# Patient Record
Sex: MALE | Race: WHITE
[De-identification: names, ages, dates, MRNs, and addresses within clinical notes are randomized per-mention and may not be internally consistent; named-entity substitution may affect disease eponyms.]

---

## 2017-02-17 ENCOUNTER — HOSPITAL ENCOUNTER (EMERGENCY)
Dept: HOSPITAL 80 - CED | Age: 67
Discharge: HOME | End: 2017-02-17
Payer: COMMERCIAL

## 2017-02-17 VITALS — TEMPERATURE: 98 F | RESPIRATION RATE: 18 BRPM

## 2017-02-17 VITALS — DIASTOLIC BLOOD PRESSURE: 96 MMHG | OXYGEN SATURATION: 97 % | HEART RATE: 95 BPM | SYSTOLIC BLOOD PRESSURE: 124 MMHG

## 2017-02-17 DIAGNOSIS — J40: Primary | ICD-10-CM

## 2017-02-17 NOTE — UCPHY
H & P


Patient Type: New


HPI/ROS: 





HPI





CHIEF COMPLAINT:  Cough, congestion, wheezing





HISTORY OF PRESENT ILLNESS:  This patient very pleasant 66-year-old male 

significant past medical history for asthma, presents to the urgent care by 

private vehicle for shortness of breath cough, congestion, and wheezing.  

Patient tells me that the past week he has been sick with chills, productive 

cough with green sputum and wheezing is worse at night.  He has significant 

past medical history for asthma however has never been intubated does not wear 

oxygen.  Does not smoke.  Tells me he has had intermittent nights over the past 

week he has done okay but persistently having a cough and now it is change 

productive.  Denies any fever last 48 hours.





Past Medical History:  Asthma





Past Surgical History:  Denies significant surgical history





Social History:  Denies daily use drugs alcohol tobacco products





Family History:  Noncontributory








ROS   


REVIEW OF SYSTEMS:


A comprehensive 10 point review of systems is otherwise negative aside from 

elements mentioned in the history of present illness.








Exam   


Constitutional    appears well, nontoxic no distress, triage nursing summary 

reviewed, vital signs reviewed, awake/alert. 


Eyes   normal conjunctivae and sclera, EOMI, PERRLA. 


HENT   normal inspection, atraumatic, moist mucus membranes, no epistaxis, neck 

supple/ no meningismus, no raccoon eyes. 


Respiratory   decreased breath sounds bilaterally, bronchitic sounding cough, 

wheezing bilaterally, no rhonchi or crackles , no distress, no tachypnea


Cardiovascular   rate normal, regular rhythm, no murmur, no edema, distal 

pulses normal. 


Gastrointestinal   soft, non-tender, no rebound, no guarding, normal bowel 

sounds, no distension, no pulsatile mass. 


Genitourinary   no CVA tenderness. 


Musculoskeletal  no midline vertebral tenderness, full range of motion, no calf 

swelling, no tenderness of extremities, no meningismus, good pulses, 

neurovascularly intact.


Skin   pink, warm, & dry, no rash, skin atraumatic. 


Neurologic   awake, alert and oriented x 3, AAOx3, moves all 4 extremities 

equally, motor intact, sensory intact, CN II-XII intact, normal cerebellar, 

normal vision, normal speech. 


Psychiatric   normal mood/affect. 


Heme/Lymph/Immune   no lymphadenopathy.





Differential Diagnosis:Includes includes but is not limited to in a particular 

order, acute bronchitis, asthma exacerbation, pneumonia, pneumothorax, CHF





Medical Decision Making:  This patient had a two view chest x-ray to rule out 

significant pneumonia, patient will receive a DuoNeb breathing treatment and 

prednisone 60 mg here in the urgent care.  I will re-evaluate.





Re-evaluation:








ED x-ray chest two view:  Negative for acute cardiopulmonary disease.  Image 

interpreted myself specifically no focal pneumonia.





Time of re-evaluation:  850AM The patient is resting comfortably here, feels 

much better after DuoNeb breathing treatment.  Room air saturation 97% no 

tachypnea.  Wheezing has greatly improved good air movement.  He feels 

comfortable being discharged.  Prescription for prednisone, azithromycin, 

guaifenesin and Norco for cough.  New albuterol inhaler.  He understands return 

to the urgent care or emergency room if develops worsening shortness of breath, 

wheezing cough congestion high fever vomiting or questions or concerns.


Source: Patient





- Family History


Significant Family History: No pertinent family hx


Constitutional: 


 Initial Vital Signs











Temperature (C)  36.6 C   02/17/17 08:12


 


Heart Rate  89   02/17/17 08:12


 


Respiratory Rate  18   02/17/17 08:12


 


Blood Pressure  164/86 H  02/17/17 08:12


 


O2 Sat (%)  92   02/17/17 08:12








 











O2 Delivery Mode               Room Air














Allergies/Adverse Reactions: 


 





No Known Allergies Allergy (Unverified 02/17/17 08:11)


 








Home Medications: 














 Medication  Instructions  Recorded


 


AZITHROMYCIN [Z-PACK] 250 mg PO DAILY #6 tab 02/17/17


 


Albuterol  02/17/17


 


Albuterol [Proventil Inhaler HFA 1 - 2 puffs IH Q4H #1 mdi 02/17/17





(*)]  


 


Alpurnal  02/17/17


 


Guaifenesin [Guaifenesin ER] 600 mg PO BID #14 tab.er.12h 02/17/17


 


Hydrocodone/APAP 5/325 [Norco 1 - 2 tab PO Q4H PRN #10 tab 02/17/17





5/325]  


 


Zopinex  02/17/17


 


predniSONE 60 mg PO DAILY #15 tab 02/17/17














Departure





- Departure


Disposition: Home, Routine, Self-Care


Clinical Impression: 


 Bronchitis





Condition: Good


Instructions:  Asthma (ED), Acute Bronchitis (ED)


Additional Instructions: 


1. Drink lots of fluids stay well-hydrated


2. return to the urgent care or emergency room if develops worsening shortness 

of breath, cough, high fever he do not feel well.


Referrals: 


Jonah Morton MD [Primary Care Provider] - As per Instructions


Prescriptions: 


Albuterol [Proventil Inhaler HFA (*)] 1 - 2 puffs IH Q4H #1 mdi


AZITHROMYCIN [Z-PACK] 250 mg PO DAILY #6 tab


Guaifenesin [Guaifenesin ER] 600 mg PO BID #14 tab.er.12h


Hydrocodone/APAP 5/325 [Norco 5/325] 1 - 2 tab PO Q4H PRN #10 tab


 PRN Reason: Pain, Moderate


predniSONE 60 mg PO DAILY #15 tab





- PQRS


PQRS Measurement: 





n/a

## 2017-10-06 ENCOUNTER — HOSPITAL ENCOUNTER (OUTPATIENT)
Dept: HOSPITAL 80 - FIMAGING | Age: 67
End: 2017-10-06
Attending: FAMILY MEDICINE
Payer: COMMERCIAL

## 2017-10-06 DIAGNOSIS — R97.20: Primary | ICD-10-CM

## 2017-10-06 DIAGNOSIS — M25.451: ICD-10-CM

## 2017-10-06 DIAGNOSIS — M25.452: ICD-10-CM

## 2017-10-06 PROCEDURE — 76377 3D RENDER W/INTRP POSTPROCES: CPT

## 2017-10-06 PROCEDURE — 72197 MRI PELVIS W/O & W/DYE: CPT

## 2017-10-06 PROCEDURE — A9585 GADOBUTROL INJECTION: HCPCS

## 2017-11-07 ENCOUNTER — HOSPITAL ENCOUNTER (OUTPATIENT)
Dept: HOSPITAL 80 - FIMAGING | Age: 67
End: 2017-11-07
Attending: FAMILY MEDICINE
Payer: COMMERCIAL

## 2017-11-07 DIAGNOSIS — M48.062: Primary | ICD-10-CM

## 2017-11-07 DIAGNOSIS — M51.35: ICD-10-CM

## 2017-11-07 DIAGNOSIS — M51.36: ICD-10-CM

## 2017-11-07 DIAGNOSIS — M48.04: ICD-10-CM

## 2017-11-07 DIAGNOSIS — M46.96: ICD-10-CM

## 2017-11-14 ENCOUNTER — HOSPITAL ENCOUNTER (OUTPATIENT)
Dept: HOSPITAL 80 - FIMAGING | Age: 67
Discharge: HOME | End: 2017-11-14
Attending: FAMILY MEDICINE
Payer: COMMERCIAL

## 2017-11-14 DIAGNOSIS — M54.16: Primary | ICD-10-CM

## 2017-11-14 PROCEDURE — 3E0R3BZ INTRODUCTION OF ANESTHETIC AGENT INTO SPINAL CANAL, PERCUTANEOUS APPROACH: ICD-10-PCS | Performed by: RADIOLOGY

## 2017-11-14 PROCEDURE — 3E0R33Z INTRODUCTION OF ANTI-INFLAMMATORY INTO SPINAL CANAL, PERCUTANEOUS APPROACH: ICD-10-PCS | Performed by: RADIOLOGY

## 2017-11-14 NOTE — PDRADPN
Radiology Procedure Note


Date of Procedure: 11/14/17


Radiologist: Rosie Cárdenas


Anesthesia: Local (Specify) (lidocaine)


Pre-op Diagnosis: severe back pain


Post-op Diagnosis: same


Indication: same


Procedure: L3-4 ANIL


Finding(s): reproduction of pain


Inf/Abcess present in the surg proc area at time of surgery?: No


EBL: Minimal


Complications: 





None

## 2018-02-14 ENCOUNTER — HOSPITAL ENCOUNTER (OUTPATIENT)
Dept: HOSPITAL 80 - FIMAGING | Age: 68
Discharge: HOME | End: 2018-02-14
Attending: NEUROLOGICAL SURGERY
Payer: COMMERCIAL

## 2018-02-14 DIAGNOSIS — M48.062: Primary | ICD-10-CM

## 2018-02-14 DIAGNOSIS — M41.26: ICD-10-CM

## 2018-02-14 DIAGNOSIS — M54.40: ICD-10-CM

## 2018-03-22 ENCOUNTER — HOSPITAL ENCOUNTER (OUTPATIENT)
Dept: HOSPITAL 80 - FIMAGING | Age: 68
End: 2018-03-22
Attending: PHYSICAL MEDICINE & REHABILITATION
Payer: COMMERCIAL

## 2018-03-22 DIAGNOSIS — M41.56: ICD-10-CM

## 2018-03-22 DIAGNOSIS — S33.140A: Primary | ICD-10-CM

## 2018-03-27 ENCOUNTER — HOSPITAL ENCOUNTER (OUTPATIENT)
Dept: HOSPITAL 80 - FIMAGING | Age: 68
Discharge: HOME | End: 2018-03-27
Attending: FAMILY MEDICINE
Payer: COMMERCIAL

## 2018-03-27 DIAGNOSIS — M48.062: Primary | ICD-10-CM

## 2018-03-27 DIAGNOSIS — M41.26: ICD-10-CM

## 2018-03-27 DIAGNOSIS — M54.40: ICD-10-CM

## 2018-03-27 PROCEDURE — 3E0S33Z INTRODUCTION OF ANTI-INFLAMMATORY INTO EPIDURAL SPACE, PERCUTANEOUS APPROACH: ICD-10-PCS

## 2018-03-27 PROCEDURE — 3E0S3BZ INTRODUCTION OF ANESTHETIC AGENT INTO EPIDURAL SPACE, PERCUTANEOUS APPROACH: ICD-10-PCS

## 2018-03-27 PROCEDURE — BR19YZZ FLUOROSCOPY OF LUMBAR SPINE USING OTHER CONTRAST: ICD-10-PCS

## 2018-09-04 ENCOUNTER — HOSPITAL ENCOUNTER (OUTPATIENT)
Dept: HOSPITAL 80 - FSGY | Age: 68
Discharge: HOME | End: 2018-09-04
Attending: ORTHOPAEDIC SURGERY
Payer: COMMERCIAL

## 2018-09-04 VITALS — SYSTOLIC BLOOD PRESSURE: 108 MMHG | DIASTOLIC BLOOD PRESSURE: 62 MMHG

## 2018-09-04 DIAGNOSIS — M75.21: ICD-10-CM

## 2018-09-04 DIAGNOSIS — M13.811: Primary | ICD-10-CM

## 2018-09-04 DIAGNOSIS — Z96.643: ICD-10-CM

## 2018-09-04 DIAGNOSIS — J45.909: ICD-10-CM

## 2018-09-04 DIAGNOSIS — E78.5: ICD-10-CM

## 2018-09-04 DIAGNOSIS — M75.111: ICD-10-CM

## 2018-09-04 PROCEDURE — 0RRJ0JZ REPLACEMENT OF RIGHT SHOULDER JOINT WITH SYNTHETIC SUBSTITUTE, OPEN APPROACH: ICD-10-PCS | Performed by: ORTHOPAEDIC SURGERY

## 2018-09-04 PROCEDURE — C1713 ANCHOR/SCREW BN/BN,TIS/BN: HCPCS

## 2018-09-04 NOTE — GOP
NAME: EDUIN JEAN

DATE OF SERVICE: 9/4/18

YOB: 1950

MEDICAL RECORD NUMBER: [id number]

SURGEON:  Bruno Laws M.D.

FIRST ASSISTANT: Krsitina Black PA-C

Mrs. Maria assistance was medically necessary for patient positioning and the 
retraction of vital structures.

ANESTHESIA:  General / regional



PREOPERATIVE DIAGNOSES:  

Right shoulder arthritis (ICD-10 code M13.819 - shoulder arthritis)

Right shoulder partial thickness rotator cuff tear (ICD-10 code M75.110  
incomplete rotator cuff tear)

Right shoulder biceps tenosynovitis (ICD-10 code M75.20  bicipital tendinitis 
of shoulder)



POSTOPERATIVE DIAGNOSES:  

Right shoulder arthritis (ICD-10 code M13.819 - shoulder arthritis)

Right shoulder partial thickness rotator cuff tear (ICD-10 code M75.110  
incomplete rotator cuff tear)

Right shoulder biceps tenosynovitis (ICD-10 code M75.20  bicipital tendinitis 
of shoulder)





OPERATIVE PROCEDURES:

CPT code 62336 - Right total shoulder arthroplasty

CPT code 40949  Right long head of biceps tenotomy

CPT code 30903 - Right long head of biceps tenodesis

CPT code 11450 - Right open rotator cuff repair, chronic



ESTIMATED BLOOD LOSS: 45 ml

COMPLICATIONS:  None.

IMPLANTS:  Exactech equinoxe platform stem, 14 mm in diameter with a 4.5-mm 
replicator plate and a 53mm by 20mm humeral head, Exactech beta pegged all-
polyethylene glenoid, simplex bone cement.



BRIEF CLINICAL NOTE:  This is a very pleasant 68 year old male with a 
significant history for right shoulder arthritis, long head of biceps 
tendinitis and partial thickness tearing as well as a partial thickness rotator 
cuff tear.  As such, I have discussed with the patient the risks, benefits, 
alternatives, and complications associated with both non-operative (specifically
, observation, PT, NSAIDs, injections) and operative (specifically, right total 
shoulder arthroplasty, right long head of biceps tenodesis, and right shoulder 
rotator cuff repair) forms of treatment.  The patient fully understood the risks
, benefits, alternatives, and complications associated with both forms of 
treatment and wished to proceed with operative intervention as outlined above.  
The patient has signed the informed consent form for surgery.



OPERATIVE NOTE:  On the day of surgery, all of the patient's questions were 
answered.  The patient was transferred from the pre-operative holding area to 
the operating room, at which point a formal "time out" procedure was performed.
  The patient was identified by name, medical record number, social security 
number, and date of birth.  The patient's right upper extremity was identified 
as the correct extremity for surgery, with the patients right shoulder being 
identified as the correct portion of that extremity for surgery.  The 
anesthesia team administered preoperative antibiotics for prophylaxis.  The 
patient was then transferred from the [place] onto the operating 
room table and was positioned in the beach chair position.  The right upper 
extremity was then prepped and draped in the normal sterile fashion.  A sterile 
marking pen was utilized to toni out a standard delto-pectoral interval 
incision.  



A #15 blade was then used to incise through the skin.  Meticulous hemostasis 
was obtained in the subcutaneous plane.  The deltopectoral interval was then 
developed with careful dissection.  The cephalic vein and the deltoid were 
retracted laterally, and the pectoralis major was retracted medially.  The 
superior 1 cm of the pectoralis was released off of the proximal humerus.  The 
long head of the biceps tendon was identified in the bicipital groove and was 
traced proximally into the rotator interval.  The rotator interval was released 
and the long head of the biceps tendon was released off of the glenoid (long 
head of biceps tenotomy) and tagged with an 0-Vicryl suture for later 
tenodesis. 



The subscapularis tendon was identified and its superior fibers demonstrated a 
high-grade partial thickness tear.  The torn portion of the subscapularis was 
tagged with an 0-Vicryl suture. Next, the remainder of the subscapularis was 
then released off of the lesser tuberosity and tagged with several additional 0-
Vicryl sutures for later repair. The humerus was then brought into extension 
and external rotation and an inferior capsular release was performed.  
Osteophytes were removed from the head-neck junction with a double action 
rongeur.  The free-hand neck cutting guide was then applied to the anterior 
aspect of the humerus and the appropriate neck resection angle was marked with 
bovie cautery.  A sagittal saw was then utilized to perform the neck cut.  The 
head was then removed from the wound and measure on the back table at a 
diameter of 53 mm.



The humeral canal was then reamed by hand starting with the 7mm reamer and 
increasing in 1mm increments up to 14 mm reamer.  The last reamer provided for 
excellent fit within the proximal humeral canal.  The proximal humerus was then 
broached starting with a 10mm broach and increasing 1mm increments up to a 14 
mm broach.  During broaching, twenty degrees of retroversion was maintained on 
the humeral side.  The 14mm humeral trial was then inserted into the proximal 
humerus and humeral protection cover was applied to the trial.  The humerus was 
then retracted posteriorly with a Loomis retractor to expose the glenoid.  A 
single point glenoid retractor was applied to the anterior aspect of the 
glenoid to improve exposure.



The glenoid labrum was then excised with bovie cautery and the center point of 
the glenoid was marked with cross hairs.  The  drill for the modular 
glenoid reamer was then utilized to drill a  hole in the center of the 
glenoid.  The glenoid was then sequentially reamed.  The drill guide for an all-
polyethylene pegged glenoid component was applied to the reamed glenoid surface 
and the peripheral peg holes were drilled.  The drill guide was then removed 
and the remaining surface of the reamed glenoid was further prepared by 
drilling a multitude of small holes.  The glenoid was then copiously irrigated 
with sterile normal saline.  A beta sized trial glenoid was then inserted into 
the glenoid.  On the humeral side, a 4.5-mm replicator plate was selected and 
opened and 53-mm trial head was applied.  The shoulder was then brought through 
a full range of passive motion.  In addition, anterior-posterior translation 
and superior-inferior translation was assessed.  All testing demonstrated 
excellent stability.  Posterior-to-anterior C-arm images were obtained and 
demonstrated excellent component positioning.  



The trial head, the replicator plate, and the trial glenoid were then removed. 
The glenoid was re-exposed and a Anuja syringe was used to inject simplex bone 
cement into the glenoid peg holes.  A beta sized all-polyethylene pegged 
glenoid component was then impacted into the glenoid and held in place until 
the cement was completely hardened.  Next, a size 14-mm Exactech equinoxe 
primary / reverse stem was opened.  The trial stem was removed and the proximal 
humerus was copiously irrigated with pulsatile lavage and then dried with 
laparotomy spones.  Two sets of drill holes were then made along the anterior 
humeral metaphysis and two #2 Fiber wire sutures were then passed through these 
drill holes for later subscapularis repair.  



A donut of simplex bone cement was then applied to the proximal humeral 
metaphysis only.  The 14-mm stem was then inserted with approximately 20 
degrees of retroversion.  The stem was held in place manually until the cement 
had completely hardened.  All excess cement was carefully removed from the 
wound.  The 4.5-mm replicator plate was then attached to the stem and oriented 
to provide for maximum coverage.  The nut for the replicator plate was then 
finally tightened using the wrench and the counter-torque device.  A 53-mm 
diameter head component was then opened and applied to the replicator plate 
with the appropriate positioning to provide for maximum coverage.  The head was 
then impacted onto the Ferrer taper.  The shoulder was then reduced and brought 
through a full range of motion.  All range of motion testing demonstrated 
excellent stability.  A posterior-to-anterior fluoroscopic image was obtained 
which demonstrated excellent component positioning.  This image was printed and 
saved.



The subscapularis was then repaired utilizing the two #2 Fiberwire sutures 
which had previously been placed through bone tunnels in the proximal humeral 
metaphysis.  The long head of the biceps tendon was then tenodesed to the 
undersurface of the pectoralis major utilizing a #2 Fiber wire.  The entire 
wound was then copiously irrigated with sterile normal saline mixed with 
bacitracin and polymixin.  The deep subcutaneous plane was re-approximated with 
2-0 vicryl sutures.  The deep dermal plane was re-approximated with 3-0 vicryl 
sutures and the skin was re-approximated with a running 3-0 monocryl 
subucuticular stitch.  The skin was then cleaned with sterile normal saline and 
dried.  Dermabond was applied to the incision.   A xeroform gauze dressing was 
then applied followed by dry sterile dressing and porous tape.  



Once the dressing was completely in place, the patient was then reversed from 
anesthesia and transferred from the operating room table onto the post-
operative gurney. The operative extremity was placed into a sling.  The patient 
was then transferred from the operating room to the post-anesthesia care unit 
in stable condition.



POSTOPERATIVE PLAN:  The patient will leave the current dressing in place and 
keep the operative extremity clean and dry.  The patient will be strict non-
weight-bearing on the operative side and will remain in the sling at all times.
  The patient will follow-up in the office in 2 weeks for a wound check and the 
initiation of shoulder ROM.





Job #:  [id number]

MTDD

## 2018-09-04 NOTE — SOAPPROG
SOAP Progress Note


Assessment/Plan: 


HISTORY AND PHYSICAL:











Name  EDUIN JEAN (67yo, M) ID# [id number]    


 


  1950  Service Dept.  MAIN OFFICE 


 


Provider  BRUNO LAWS M.D.   


 


Insurance  Med Primary: [facility] (MEDICARE REPLACEMENT/ADVANTAGE - HMO)


Insurance # : [id number]


Policy/Group # : [id number] 


Prescription: ORX - Member is eligible. details   








Patient presents today to review his right shoulder MRI to evaluate for 

concomitant rotator cuff tear. Patient doing well but has constent discomfort 

in right shoulde. Symptoms have increase in his left shoulder since last visit. 





Patient's Care Team 


Primary Care Provider: WALKER BUTTERFIELD DO: [id number] [facility] [address], [place] [address], Ph [phone], Fax [phone] NPI: [id number]


Patient's Pharmacies 


[facility] #[id number] ([facility]): [address], [facility] [address], Ph [phone], Fax [phone]


Vitals 


None recorded. 


Allergies 











Reviewed Allergies


  


 


NKDA  

















Reviewed Medications


   


 


  


 


Advair Diskus 100 mcg-50 mcg/dose powder for inhalation  17 filled  

[facility] 


 


allopurinol 300 mg tablet  18 filled  [facility] 


 


azithromycin 250 mg tablet  17 filled  [facility] 


 


celecoxib 200 mg capsule  17 filled  [facility] 


 


DEPO-Medrol 40 mg/mL suspension for injection 


Take 2 mg by injection route.


Note: lot#[id number] exp 2019 bilateral shoulders  17 administered  

Juan Francisco White M.D. 


 


Fluzone High-Dose [id number] (PF) 180 mcg/0.5 mL intramuscular syringe  10/04/17 

filled  [facility] 


 


gabapentin 100 mg capsule  17 filled  [facility] 


 


HYDROcodone 5 mg-acetaminophen 325 mg tablet  18 filled  [facility] 


 


indomethacin 50 mg capsule  18 filled  [facility] 


 


levalbuterol HFA 45 mcg/actuation aerosol inhaler  17 filled  [facility] 


 


methylPREDNISolone 4 mg tablets in a dose pack  18 filled  [facility] 


 


predniSONE 20 mg tablet  10/30/17 filled  [facility] 


 


traMADol 50 mg tablet 


Take 1 tablet every 6 hours by oral route as needed for pain  18 

prescribed  Bruno Laws M.D. 


 


Ventolin HFA 90 mcg/actuation aerosol inhaler  17 filled  [facility] 


 


Viagra 100 mg tablet  17 filled  [facility] 











None recorded. 


Problems 


Reviewed Problems


* Bilateral shoulder joint pain 


Family History 


Reviewed Family History


Social History 


Reviewed Social History


Smoking Status: Never smoker


Occupation: retired


Chewing tobacco: none


Alcohol intake: Occasional


Caffeine intake: Moderate


Exercise level: Moderate


Sporting activities: Hiking, walking, skiing


Hand Dominance: Right


Education: 4 Year College


Live alone or with others?: with others 


Surgical History 


Reviewed Surgical History


* Orthopaedic Surgery - 1997 


Past Medical History 


Reviewed Past Medical History


Arthritis: Y


Asthma: Y


Gout: Y


Screening 


None recorded. 





HPI 


This is a very pleasant 68 year old male with: 


-right shoulder end-stage osteoarthritis, AC joint arthritis, RC tendinosis and 

biceps tendinosis s/p GH injections on 10/9/15, 10/19/16, and 17 


-left shoulder end-stage osteoarthritis and AC joint arthritis s/p GH 

injections on 10/19/16, 17 


He presents today to discuss the results of his recent right shoulder MRI. 





ROS 


ROS as noted in the HPI 


Physical Exam 





Patient is a 68-year-old male. 


Bilateral shoulder examination


Inspection/palpation:


Right: TTP overlying the AC joint and posterior shoulder


Left: TTP overlying the AC joint and posterior shoulder





Shoulder ROM (R / L / Normal)


Forward flexion: 150 / 150 / 170


Abduction: 130 / 130 / 160


Extension: 20 / 20 / 40





Shoulder strength (R / L / Normal)


Deltoid : 5/ 5 / 5


Biceps: 5/ 5 / 5





Shoulder sensory (R / L / Normal)


Axillary: + / + / +





Shoulder tests


Stability tests


OBriens: - / - / -


Apprehension: - / - / -


Relocation: - / - / -


Jerk: - / - / -





Rotator cuff tests


Empty can: - / - / -


Belly press: - / - / -


Lift off : - / - / -





Impingement tests


Jimmie: - / - / -


Neer: - / - / -


Cross-arm adduction: - / - / -





Biceps test


Speeds: - / - / -


Yergason supination: - / - / - 





Assessment / Plan 





This is a very pleasant 68 year old male with: 


-right shoulder end-stage osteoarthritis, AC joint arthritis, RC tendinosis and 

biceps tendinosis s/p GH injections on 10/9/15, 10/19/16, and 17 


-left shoulder end-stage osteoarthritis and AC joint arthritis s/p GH 

injections on 10/19/16, 17 


- 18 - right shoulder MRI -- severe degenerative change in the 

glenohumeral joint, partial tear distal supraspinatus tendon, moderate 

tendinopathy intra-articular long head biceps tendon 





- I have discussed with the patient the risks, benefits, alternatives and 

complications associated with both non-operative (specifically, observation, 

NSAIDs, injection) and operative (specifically, right total shoulder 

arthroplasty, right long head of biceps tenodesis, and possible right shoulder 

rotator cuff repair) forms of treatment 


- The patient fully understands the risks, benefits, alternatives, and 

complications associated with both forms of treatment and wishes to proceed 

with operative intervention as outlined above 


- He has signed the informed consent form for surgery and surgery will be 

scheduled for the near future. 





1. Localized, primary osteoarthritis of the shoulder region 


M19.019: Primary osteoarthritis, unspecified shoulder 


* SHOULDER ARTHRITIS: EXERCISES 


* tramadol 50 mg tablet - 


Take 1 tablet every 6 hours by oral route as needed for pain Qty: 30 tablet(s) 

Refills: 0 Pharmacy: [facility] #[id number] 


2. Shoulder pain - Bilateral 


M25.512: Pain in left shoulder


M25.511: Pain in right shoulder 


3. Osteoarthritis of glenohumeral joint 


M19.019: Primary osteoarthritis, unspecified shoulder 





MRI, SHOULDER, W/O CONTRAST 


Side: RIGHT


1. Severe degenerative change in the glenohumeral joint with glenohumeral joint 

effusion with synovial proliferation and loose bodies. Diffuse labral 

pathology. 


2. Mild tendinopathy and partial tear distal supraspinatus tendon. Mild 

tendinopathy infraspinatus tendon. Mild subacromial bursitis. 


3. Moderate tendinopathy intra-articular long head biceps tendon. 


4. Mild tendinopathy distal subscapularis tendon. 


5. Moderate degenerative change acromioclavicular joint. Mild anterior curve to 

the acromion. Anterior ssubacromial spur. 





Encounter Sign-Off 


Encounter signed-off by Bruno Laws M.D.





18 06:44





18 06:46





Objective: 





 Vital Signs











Temp Pulse Resp BP Pulse Ox


 


 36.8 C   55 L  14   123/71 H  93 


 


 18 06:24  18 06:24  18 06:24  18 06:24  18 06:24














ICD10 Worksheet


Patient Problems: 


 Problems











Problem Status Onset


 


Arthritis of right shoulder region Acute  














- ICD10 Problem Qualifiers


(1) Arthritis of right shoulder region

## 2018-09-04 NOTE — POSTOPPROG
Post Op Note


Date of Operation: 09/04/18


Surgeon: Bruno Laws


Assistant: Kristina Black PA-C


Anesthesiologist: Dr. Chen


Anesthesia: GET(General Endotracheal)


Pre-op Diagnosis: right shoulder osteoarthritis


Post-op Diagnosis: right shoulder osteoarthritis


Indication: right shoulder osteoarthritis; pain


Procedure: right total shoulder arthroplasty and biceps tenodesis


Inf/Abcess present in the surg proc area at time of surgery?: No


Depth: Deep Incisional (Fascial)


EBL:

## 2018-09-04 NOTE — PDANEPAE
ANE History of Present Illness





R total shoulder replacement





ANE Past Medical History





- Cardiovascular History


Hx Hypertension: No


Hx Arrhythmias: No


Hx Chest Pain: No


Hx Coronary Artery / Peripheral Vascular Disease: No


Hx CHF / Valvular Disease: No


Hx Palpitations: No





- Pulmonary History


Hx COPD: No


Hx Asthma/Reactive Airway Disease: Yes


Hx Recent Upper Respiratory Infection: No


Hx Oxygen in Use at Home: No


Hx Sleep Apnea: No


Sleep Apnea Screening Result - Last Documented: Negative


Pulmonary History Comment: SEASONAL HAY FEVER





- Neurologic History


Hx Cerebrovascular Accident: No


Hx Seizures: No


Hx Dementia: No





- Endocrine History


Hx Diabetes: No





- Renal History


Hx Renal Disorders: No





- Liver History


Hx Hepatic Disorders: No





- Neurological & Psychiatric Hx


Hx Neurological and Psychiatric Disorders: No





- Cancer History


Hx Cancer: No





- Congenital Disorder History


Hx Congenital Disorders: No





- Other Health History


Other Health History: NEG





- Chronic Pain History


Chronic Pain: Yes (R SHOULDER & L SHOULDER)





- Surgical History


Prior Surgeries: SHANA KRYSTEN.  VASECTOMY.  TONSILLECTOMY





ANE Review of Systems


Review of systems is: negative


Review of Systems: 








- Exercise capacity


METS (RN): 4 METS





ANE Patient History





- Allergies


Allergies/Adverse Reactions: 








No Known Allergies Allergy (Verified 08/31/18 12:01)


 








- Home Medications


Home medications: home medication list seen and reviewed


Home Medications: 








Allopurinol 300 MG (RX) 300 mg PO DAILY 11/14/17 [Last Taken 09/03/18]


Advair 100/50 (*) PRN 02/09/18 [Last Taken 2 Days Ago ~09/02/18]


Aspirin  07/23/18 [Last Taken 08/29/18]


Herbals/Supplements -Info Only  07/23/18 [Last Taken 08/29/18]








- NPO status


NPO Since - Liquids (Date): 09/03/18


NPO Since - Liquids (Time): 20:00


NPO Since - Solids (Date): 09/03/18


NPO Since - Solids (Time): 19:00





- Anes Hx


Hx Anesthesia Complications (with details): difficult intubation





- Smoking Hx


Smoking Status: Never smoked





- Family Anes Hx


Family Anes Hx: none


Family Hx Anesthesia Complications: NEG





ANE Labs/Vital Signs





- Vital Signs


Vital Signs: reviewed preoperatively; see RN documention for details


Blood Pressure: 123/71


Heart Rate: 55


Respiratory Rate: 14


O2 Sat (%): 93


Height: 167.64 cm


Weight: 64.41 kg





ANE Physical Exam





- Airway


Neck exam: decreased ROM


Mallampati Score: Class 1


Mouth exam: normal dental/mouth exam





- Pulmonary


Pulmonary: no respiratory distress





- Cardiovascular


Cardiovascular: regular rate and rhythym





- ASA Status


ASA Status: II





ANE Anesthesia Plan


Anesthesia Plan: GA w LMA


Regional Anesthesia: single shot NB, interscalene BP NB

## 2019-04-10 NOTE — SOAPPROG
SOAP Progress Note


Assessment/Plan: 


HISTORY AND PHYSICAL





Name   EDUIN JEAN (69yo, M) ID# [id number]    


   1950   Service Dept.   MAIN OFFICE


Provider   NEERU PRICE PA-C


Insurance   


Med Primary: [facility] (MEDICARE REPLACEMENT/ADVANTAGE - HMO)


Insurance # : [id number]


Policy/Group # : [id number]


Prescription: ORX - Member is eligible. details


Chief Complaint


Pt is here today for his right shoulder recheck. Pt is doing well. 


Pt is here today for his right shoulder post-op. Pt is doing well 


PO Right shoulder, doin very well 


Patient presents today 2 weeks s/p right TSA on 2018. Patient doing well.


Patient's Care Team


Primary Care Provider: WALKER BUTTERFIELD DO: [id number], [place] [address], Ph [phone], Fax [phone] NPI: [id number]


Patient's Pharmacies


[facility] #[id number] ([facility]): [address], [facility] [address], Ph [phone], Fax [phone]


Vitals


None recorded.


Allergies


Allergies not reviewed (last reviewed 2019)


NKDA


Medications


Medications not reviewed (last reviewed 2019)


acetaminophen 300 mg-codeine 30 mg tablet


18   filled   [facility]


Advair Diskus 100 mcg-50 mcg/dose powder for inhalation


17   filled   [facility]


allopurinol 300 mg tablet


19   filled   [facility]


amoxicillin 500 mg capsule


18   filled   [facility]


azithromycin 250 mg tablet


17   filled   [facility]


celecoxib 200 mg capsule


17   filled   [facility]


chlorhexidine gluconate 0.12 % mouthwash


18   filled   [facility]


DEPO-Medrol 40 mg/mL suspension for injection


Take 2 mg by injection route.


Note: lot#[id number] exp 2019 bilateral shoulders


17   administered   Juan Francisco White M.D.


Fluzone High-Dose [id number] (PF) 180 mcg/0.5 mL intramuscular syringe


10/04/17   filled   [facility]


gabapentin 100 mg capsule


17   filled   [facility]


HYDROcodone 5 mg-acetaminophen 325 mg tablet


18   filled   [facility]


indomethacin 25 mg capsule


Take 1 capsule(s) twice a day by oral route.


18   filled   [facility]


indomethacin 50 mg capsule


Take 1 capsule(s) twice a day by oral route as needed.


19   filled   [facility]


levalbuterol HFA 45 mcg/actuation aerosol inhaler


17   filled   [facility]


methylPREDNISolone 4 mg tablets in a dose pack


18   filled   [facility]


oxyCODONE 5 mg tablet


Take 1 tablet(s) every 4 hours by oral route as needed.


18   filled   [facility]


oxyCODONE 5 mg tablet,oral ONLY (not feeding tubes)


Take 1 tablet(s) every 4 hours by oral route as needed.


09/10/18   prescribed   NEERU PRICE PA-C


predniSONE 20 mg tablet


10/30/17   filled   [facility]


Shingrix (PF) 50 mcg/0.5 mL intramuscular suspension, kit


19   filled   [facility]


traMADol 50 mg tablet


Take 1 tablet every 6 hours by oral route as needed for pain


19   filled   [facility]


Ventolin HFA 90 mcg/actuation aerosol inhaler


17   filled   [facility]


Viagra 100 mg tablet


17   filled   [facility]


Vaccines


None recorded.


Problems


Reviewed Problems


Osteoarthritis of glenohumeral joint - Onset: 2018, Bilateral


Bilateral shoulder joint pain


Family History


Family History not reviewed (last reviewed 2019)


Social History


Social History not reviewed (last reviewed 2019)


Smoking Status: Never smoker


Occupation: retired


Chewing tobacco: none


Alcohol intake: Occasional


Caffeine intake: Moderate


Exercise level: Moderate


Sporting activities: Hiking, walking, skiing


Hand Dominance: Right


Education: 4 Year College


Live alone or with others?: with others


Surgical History


Surgical History not reviewed (last reviewed 2019)


Total shoulder arthroplasty (surg) - 2018


Orthopaedic Surgery - 1997


Past Medical History


Past Medical History not reviewed (last reviewed 2019)


Arthritis: Y


Asthma: Y


Gout: Y


Screening


None recorded.


HPI


This is a very pleasant 68 year old male with:








-right shoulder end-stage osteoarthritis, AC joint arthritis, RC tendinosis and 

biceps tendinosis s/p GH injections on 10/9/15, 10/19/16, and 17





-left shoulder end-stage osteoarthritis and AC joint arthritis s/p GH 

injections on 10/19/16, 17





- 18 - right shoulder MRI -- severe degenerative change in the 

glenohumeral joint, partial tear distal supraspinatus tendon, moderate 

tendinopathy intra-articular long head biceps tendon





- 18 -- Right total shoulder arthroplasty, right long head of biceps 

tenodesis, and right open RCR





- 3/1/19 -- left shoulder MRI -- severe degenerative changes in the 

glenohumeral joint, partial tear distal supraspinatus, partial tear of biceps 

tendon








He presents today for a routine follow up and reports that his right shoulder 

is doing great. His left shoulder pain is not improving and would like to 

discuss his treatment options. His left shoulder MRI is ready to review.





ROS


ROS as noted in the HPI


Physical Exam


Patient is a 68-year-old male.





Bilateral shoulder examination


Inspection/palpation:


Right: Nicely healed deltopectoral incision


Left: TTP overlying the AC joint and posterior shoulder





Shoulder ROM (R / L / Normal)


Forward flexion: 170 active / 170 / 170


Abduction: 160 active / 160 / 160


Extension: 40 / 40 / 40





Shoulder strength (R / L / Normal)


Deltoid : 3 / 4 / 5


Biceps: 3 / 4 / 5





Shoulder sensory (R / L / Normal)


Axillary: + / + / +





Shoulder tests


Stability tests


OBriens: - / + / -


Apprehension: - / - / -


Relocation: - / - / -


Jerk: - / - / -





Rotator cuff tests


Empty can: - / + / -


Belly press: + / - / -


Lift off : - / - / -





Impingement tests


Jimmie: - / + / -


Neer: - / + / -


Cross-arm adduction: - / + / -





Biceps test


Speeds: - / + / -


Yergason supination: - / + / -


Assessment / Plan


This is a very pleasant 68 year old male with:








-right shoulder end-stage osteoarthritis, AC joint arthritis, RC tendinosis and 

biceps tendinosis s/p GH injections on 10/9/15, 10/19/16, and 17





-left shoulder end-stage osteoarthritis and AC joint arthritis s/p GH 

injections on 10/19/16, 17





- 18 - right shoulder MRI -- severe degenerative change in the 

glenohumeral joint, partial tear distal supraspinatus tendon, moderate 

tendinopathy intra-articular long head biceps tendon





- 18 -- Right total shoulder arthroplasty, right long head of biceps 

tenodesis, and right open RCR





- 3/1/19 -- left shoulder MRI -- severe degenerative changes in the 

glenohumeral joint, partial tear distal supraspinatus, partial tear of biceps 

tendon








For the right shoulder:





-Continue HEP and PT for right shoulder ROM until MMI





-Continue WBAT RUE





-FU in 6 months for repeat right shoulder xrays (AP, Y, axillary)








For the left shoulder:





- I have discussed with the patient the risks, benefits, alternatives and 

complications associated with non-operative (specifically, observation, 

activity modifications,NSAIDs, injection) and operative (specifically, left 

total shoulder arthroplasty, possible RCR repair and/or debridement, and long 

head biceps tenotomy and tenodesis) forms of treatment.





- The patient fully understands the risks, benefits, alternatives, and 

complications associated with these forms of treatment and wishes to proceed 

with surgery.





- He has signed the informed consent form for surgery and surgery will be 

scheduled for the near future.








1. Shoulder pain


M25.519: Pain in unspecified shoulder





2. History of total arthroplasty of right shoulder joint


Z96.611: Presence of right artificial shoulder joint





3. Surgical follow-up


Z09: Encounter for follow-up examination after completed treatment for 

conditions other than malignant neoplasm





4. Osteoarthritis of glenohumeral joint - Bilateral


M19.011: Primary osteoarthritis, right shoulder








Return to Office


None recorded.


Encounter Sign-Off


Encounter signed-off by NEERU PRICE PA-C,  





04/10/19 17:11








ICD10 Worksheet


Patient Problems: 


 Problems











Problem Status Onset


 


Arthritis of right shoulder region Acute

## 2019-04-11 ENCOUNTER — HOSPITAL ENCOUNTER (OUTPATIENT)
Dept: HOSPITAL 80 - FSGY | Age: 69
Discharge: HOME | End: 2019-04-11
Attending: ORTHOPAEDIC SURGERY
Payer: COMMERCIAL

## 2019-04-11 VITALS — DIASTOLIC BLOOD PRESSURE: 73 MMHG | SYSTOLIC BLOOD PRESSURE: 140 MMHG

## 2019-04-11 DIAGNOSIS — M75.112: ICD-10-CM

## 2019-04-11 DIAGNOSIS — M19.012: Primary | ICD-10-CM

## 2019-04-11 DIAGNOSIS — Z96.611: ICD-10-CM

## 2019-04-11 DIAGNOSIS — M67.814: ICD-10-CM

## 2019-04-11 PROCEDURE — C1713 ANCHOR/SCREW BN/BN,TIS/BN: HCPCS

## 2019-04-11 PROCEDURE — 0LS20ZZ REPOSITION LEFT SHOULDER TENDON, OPEN APPROACH: ICD-10-PCS | Performed by: ORTHOPAEDIC SURGERY

## 2019-04-11 PROCEDURE — 0RRK0JZ REPLACEMENT OF LEFT SHOULDER JOINT WITH SYNTHETIC SUBSTITUTE, OPEN APPROACH: ICD-10-PCS | Performed by: ORTHOPAEDIC SURGERY

## 2019-04-11 PROCEDURE — 0LQ20ZZ REPAIR LEFT SHOULDER TENDON, OPEN APPROACH: ICD-10-PCS | Performed by: ORTHOPAEDIC SURGERY

## 2019-04-11 NOTE — PDANEPAE
ANE Past Medical History





- Cardiovascular History


Hx Hypertension: No


Hx Arrhythmias: No


Hx Chest Pain: No


Hx Coronary Artery / Peripheral Vascular Disease: No


Hx CHF / Valvular Disease: No


Hx Palpitations: No





- Pulmonary History


Hx COPD: No


Hx Asthma/Reactive Airway Disease: Yes


Hx Recent Upper Respiratory Infection: No


Hx Oxygen in Use at Home: No


Hx Sleep Apnea: No


Sleep Apnea Screening Result - Last Documented: Negative


Pulmonary History Comment: EXERCISE INDUCED ASTHMA.  SEASONAL HAY FEVER





- Neurologic History


Hx Cerebrovascular Accident: No


Hx Seizures: No


Hx Dementia: No





- Endocrine History


Hx Diabetes: No





- Renal History


Hx Renal Disorders: No





- Liver History


Hx Hepatic Disorders: No





- Neurological & Psychiatric Hx


Hx Neurological and Psychiatric Disorders: No





- Cancer History


Hx Cancer: No





- Congenital Disorder History


Hx Congenital Disorders: No





- GI History


Hx Gastrointestinal Disorders: No





- Other Health History


Other Health History: NEG





- Chronic Pain History


Chronic Pain: Yes (LT SHLDR)





- Surgical History


Prior Surgeries: RT TOTAL SHLDR.  SHANA KRYSTEN.  VASECTOMY.  TONSILLECTOMY





ANE Review of Systems


Review of Systems: 








- Exercise capacity


METS (RN): 4 METS





ANE Patient History





- Allergies


Allergies/Adverse Reactions: 








No Known Allergies Allergy (Verified 08/31/18 12:01)


 








- Home Medications


Home Medications: 








Allopurinol 300 MG (RX) 300 mg PO DAILY 11/14/17 [Last Taken 04/10/19 09:00]


Advair 100/50 (*) IH PRN 02/09/18 [Last Taken 04/11/19 05:00]


Xopenex Hfa PRN 03/26/19 [Last Taken 04/11/19 05:00]


Indomethacin  04/11/19 [Last Taken 04/01/19]








- NPO status


NPO Since - Liquids (Date): 04/10/19


NPO Since - Liquids (Time): 21:30


NPO Since - Solids (Date): 04/10/19


NPO Since - Solids (Time): 17:30





- Anes Hx


Hx Anesthesia Complications (with details): h/o difficult intubation, small 

mouth opening





- Smoking Hx


Smoking Status: Never smoked





- Family Anes Hx


Family Hx Anesthesia Complications: NEG





ANE Labs/Vital Signs





- Vital Signs


Blood Pressure: 152/89


Heart Rate: 68


Respiratory Rate: 18


O2 Sat (%): 96


Height: 168.91 cm


Weight: 64.864 kg





ANE Physical Exam





- Airway


Mallampati Score: Class 2


Mouth exam: small mouth opening





- ASA Status


ASA Status: II





ANE Anesthesia Plan


Anesthesia Plan: GA w LMA


Regional Anesthesia: interscalene BP NB

## 2019-04-14 NOTE — GOP
[f rep st]



                                                                OPERATIVE REPORT





DATE OF OPERATION:  04/11/2019



SURGEON:  Bruno Laws MD



ASSISTANT:  Kristina Stevenson PA-C.



PREOPERATIVE DIAGNOSIS:  

1.  Left shoulder arthritis.

2.  Left shoulder long head of biceps __________.  

3.  Left shoulder partial rotator cuff tear.



POSTOPERATIVE DIAGNOSIS:  __________



PROCEDURE PERFORMED:  

1.  Left total shoulder arthroplasty.  

2.  Left shoulder long head of biceps tenodesis.  

3.  Shoulder rotator cuff repair.



FINDINGS:  





ESTIMATED BLOOD LOSS:  25 cc.



DESCRIPTION OF PROCEDURE:  This is a very pleasant 68-year-old male who underwent a left total should
er arthroplasty with rotator cuff repair and long head of biceps tenodesis.



COMPLICATIONS:  None.



IMPLANTS:  Exactech 50 mm head, Exactech 4.5 mm replicator plate, Exactech equinox 12 mm primary pres
s-fit stem, Exactech all-polyethylene beta glenoid, simplex bone cement.





Job #:  [id number]